# Patient Record
Sex: MALE | Race: WHITE | Employment: UNEMPLOYED | URBAN - METROPOLITAN AREA
[De-identification: names, ages, dates, MRNs, and addresses within clinical notes are randomized per-mention and may not be internally consistent; named-entity substitution may affect disease eponyms.]

---

## 2018-08-06 PROCEDURE — 96365 THER/PROPH/DIAG IV INF INIT: CPT | Performed by: EMERGENCY MEDICINE

## 2018-08-06 PROCEDURE — 99284 EMERGENCY DEPT VISIT MOD MDM: CPT | Mod: 25 | Performed by: EMERGENCY MEDICINE

## 2018-08-06 PROCEDURE — 87880 STREP A ASSAY W/OPTIC: CPT | Performed by: FAMILY MEDICINE

## 2018-08-06 PROCEDURE — 99284 EMERGENCY DEPT VISIT MOD MDM: CPT | Mod: Z6 | Performed by: EMERGENCY MEDICINE

## 2018-08-06 PROCEDURE — 96375 TX/PRO/DX INJ NEW DRUG ADDON: CPT | Performed by: EMERGENCY MEDICINE

## 2018-08-07 ENCOUNTER — HOSPITAL ENCOUNTER (EMERGENCY)
Facility: CLINIC | Age: 38
Discharge: HOME | End: 2018-08-07
Attending: EMERGENCY MEDICINE | Admitting: EMERGENCY MEDICINE

## 2018-08-07 VITALS
WEIGHT: 211.64 LBS | DIASTOLIC BLOOD PRESSURE: 67 MMHG | TEMPERATURE: 100.9 F | HEART RATE: 98 BPM | RESPIRATION RATE: 16 BRPM | OXYGEN SATURATION: 97 % | SYSTOLIC BLOOD PRESSURE: 111 MMHG

## 2018-08-07 DIAGNOSIS — J02.0 STREP THROAT: ICD-10-CM

## 2018-08-07 DIAGNOSIS — R50.9 FEVER, UNSPECIFIED FEVER CAUSE: ICD-10-CM

## 2018-08-07 LAB
DEPRECATED S PYO AG THROAT QL EIA: ABNORMAL
SPECIMEN SOURCE: ABNORMAL

## 2018-08-07 PROCEDURE — 25000132 ZZH RX MED GY IP 250 OP 250 PS 637: Performed by: EMERGENCY MEDICINE

## 2018-08-07 PROCEDURE — 25000128 H RX IP 250 OP 636: Performed by: EMERGENCY MEDICINE

## 2018-08-07 RX ORDER — CEFTRIAXONE SODIUM 1 G
1 VIAL (EA) INJECTION ONCE
Status: COMPLETED | OUTPATIENT
Start: 2018-08-07 | End: 2018-08-07

## 2018-08-07 RX ORDER — ACETAMINOPHEN AND CODEINE PHOSPHATE 300; 30 MG/1; MG/1
2 TABLET ORAL EVERY 6 HOURS PRN
Qty: 12 TABLET | Refills: 0 | Status: SHIPPED | OUTPATIENT
Start: 2018-08-07

## 2018-08-07 RX ORDER — ACETAMINOPHEN 500 MG
1000 TABLET ORAL ONCE
Status: COMPLETED | OUTPATIENT
Start: 2018-08-07 | End: 2018-08-07

## 2018-08-07 RX ORDER — KETOROLAC TROMETHAMINE 30 MG/ML
30 INJECTION, SOLUTION INTRAMUSCULAR; INTRAVENOUS ONCE
Status: COMPLETED | OUTPATIENT
Start: 2018-08-07 | End: 2018-08-07

## 2018-08-07 RX ORDER — IBUPROFEN 200 MG
600 TABLET ORAL AT BEDTIME
Qty: 15 TABLET | Refills: 0 | Status: SHIPPED | OUTPATIENT
Start: 2018-08-07 | End: 2018-08-12

## 2018-08-07 RX ADMIN — CEFTRIAXONE SODIUM 1 G: 1 INJECTION, POWDER, FOR SOLUTION INTRAMUSCULAR; INTRAVENOUS at 01:42

## 2018-08-07 RX ADMIN — ACETAMINOPHEN 1000 MG: 500 TABLET ORAL at 01:41

## 2018-08-07 RX ADMIN — KETOROLAC TROMETHAMINE 30 MG: 30 INJECTION, SOLUTION INTRAMUSCULAR at 01:42

## 2018-08-07 ASSESSMENT — ENCOUNTER SYMPTOMS
FATIGUE: 1
FEVER: 1
SORE THROAT: 1
CHILLS: 1

## 2018-08-07 NOTE — ED PROVIDER NOTES
History     Chief Complaint   Patient presents with     Fever     Pt c/o throbbing throat pain, severe headache and generalized body ache.Having chills.     Generalized Body Aches     HPI  Peter Mcdaniels is a 37 year old male who is seen for evaluation of fevers chills and sore throat that has been ongoing for 2 days.  She denies any nausea no vomiting no abdominal pain no vision changes no headache.  Patient says that Tylenol helps but after the 4 hours are gone she is chills and fever returns no cough no dysuria.    Current Facility-Administered Medications   Medication     acetaminophen (TYLENOL) tablet 1,000 mg     cefTRIAXone (ROCEPHIN) injection 1 g     ketorolac (TORADOL) injection 30 mg     Current Outpatient Prescriptions   Medication     acetaminophen-codeine (TYLENOL WITH CODEINE #3) 300-30 MG per tablet     amoxicillin-clavulanate (AUGMENTIN) 875-125 MG per tablet     Diphenhydramine-PE-APAP (SEVERE COLD & COUGH NIGHTTIME PO)     ibuprofen (ADVIL/MOTRIN) 200 MG tablet     History reviewed. No pertinent past medical history.   No Known Allergies  Past Surgical History:   Procedure Laterality Date     ABDOMEN SURGERY           I have reviewed the Medications, Allergies, Past Medical and Surgical History, and Social History in the Epic system.    Review of Systems   Constitutional: Positive for chills, fatigue and fever.   HENT: Positive for sore throat.    All other systems reviewed and are negative.      Physical Exam   BP: 124/74  Heart Rate: 108  Temp: 101.9  F (38.8  C)  Resp: 18  Weight: 96 kg (211 lb 10.3 oz)  SpO2: 96 %      Physical Exam   Constitutional: No distress.   HENT:   Head: Atraumatic.   Mouth/Throat: Oropharynx is clear and moist. No oropharyngeal exudate.   Patient has mild to moderate pharyngitis but there is no trismus there is no evidence of PTA.  No masses.  Symmetric oropharynx noted   Eyes: Pupils are equal, round, and reactive to light. No scleral icterus.   Neck: Normal  range of motion. Neck supple. No JVD present.   Cardiovascular: Normal heart sounds and intact distal pulses.    Pulmonary/Chest: Breath sounds normal. No respiratory distress. He has no wheezes. He has no rales. He exhibits no tenderness.   Abdominal: Soft. Bowel sounds are normal. He exhibits no distension and no mass. There is no tenderness. There is no rebound and no guarding.   Musculoskeletal: He exhibits no edema or tenderness.   Lymphadenopathy:     He has cervical adenopathy.   Skin: Skin is warm. No rash noted. He is not diaphoretic. No erythema. No pallor.       ED Course     ED Course   Patient noted to be positive for strep throat which was done by triage at this point based on the history physical examination presentation I do not feel that this patient is septic I feel that his mild tachycardia is relative due to the elevated temperature.. Patient is clearly nontoxic and well-appearing no evidence of peritonsillar abscess.  Want to treat as outpatient with p.o. antibiotics and follow up.    Procedures   None         Critical Care time:  none             Labs Ordered and Resulted from Time of ED Arrival Up to the Time of Departure from the ED   RAPID STREP SCREEN - Abnormal; Notable for the following:        Result Value    Rapid Strep A Screen   (*)     Value: POSITIVE: Group A Streptococcal antigen detected by immunoassay.    All other components within normal limits            Assessments & Plan (with Medical Decision Making)   This is a well-appearing 37-year-old male without significant past medical history that comes in for evaluation of 2 day history of sore throat body aches myalgias fevers and chills.  She denies any abdominal pain no flank pain no trauma nausea no vomiting no headaches.  Physical exam reveals a well-appearing male in very mild acute distress secondary to mild fever.  She is also to have a reactive tachycardia but likely secondary to the fever.  Due to the fact that this  patient is clinically nontoxic in no significant distress I do not feel that there is no need for labs I plan to treat his symptoms with Toradol and Tylenol and will also treat him with Augmentin p.o. as outpatient.  Patient agrees, family agrees.    I have reviewed the nursing notes.    I have reviewed the findings, diagnosis, plan and need for follow up with the patient.    New Prescriptions    ACETAMINOPHEN-CODEINE (TYLENOL WITH CODEINE #3) 300-30 MG PER TABLET    Take 2 tablets by mouth every 6 hours as needed for pain    AMOXICILLIN-CLAVULANATE (AUGMENTIN) 875-125 MG PER TABLET    Take 1 tablet by mouth 2 times daily for 10 days    IBUPROFEN (ADVIL/MOTRIN) 200 MG TABLET    Take 3 tablets (600 mg) by mouth At Bedtime for 5 days       Final diagnoses:   Strep throat   Fever, unspecified fever cause       8/6/2018   [facility], [place], EMERGENCY DEPARTMENT     Darnell King MD  08/07/18 0133

## 2018-08-07 NOTE — ED AVS SNAPSHOT
" [facility], Emergency Department    [address]    [facility] [place] [address]    Phone:  [phone]    Fax:  [phone]                                       Peter Mcdaniels   MRN: [id number]    Department:  [facility], Emergency Department   Date of Visit:  8/6/2018           Patient Information     Date Of Birth          1980        Your diagnoses for this visit were:     Strep throat     Fever, unspecified fever cause        You were seen by Darnell King MD.      Follow-up Information     Follow up with [facility] In 2 days.    Specialties:  Family Medicine, Internal Medicine    Why:  2 make sure that you are getting better    Contact information:    [address] [address] [phone]        Discharge Instructions       Home  Back    ÇáÊåÇÈ ÇáÈáÚæã (Pharyngitis): ÈÈßÊÑíÇ \"ÓÊÑíÈ\" (STREP) [ÅÕÇÈÉ ãÄßÏÉ (Confirmed)]    ßÔÝ ÇáÝÍÕ ÇáÐí ÎÖÚÊ áå Úä ÅÕÇÈÊß ÈÈÈßÊÑíÇ ÇáÍóáÞ. ÊãËá ÈßÊÑíÇ ÇáÍáÞ ãÑÖÇð ãõÚÏíÇð  ÍíË íäÊÔÑ ÈÇáÓÚÇá Ãæ ÇáÊÞÈíá Ãæ áãÓ ÇáÂÎÑíä ÈÚÏ áãÓ Ýãß Ãæ ÃäÝß. æÊÔÊãá ÇáÃÚÑÇÖ Úáì Ãáã ÇáÒæÑ (ÇáÍáÞ) ÇáãÕÍæÈ ÈÇáÊæÑã  ææÌÚ ÇáÌÓã ÈÃßãáå  æÇáÕÏÇÚ  æÇáÍãì. ÓæÝ íÊã ÚáÇÌß ÈãÖÇÏ Ííæí (antibiotic) æÇáÐí íõÝÊÑÖ Ãä íÌÚáß ÊÔÚÑ ÈÊÍÓä ÎáÇá íæã Ãæ ÇËäíä.  ÇáÑÚÇíÉ Ýí ÇáãäÒá:    ÇáÊÒã ÈÇáÑÇÍÉ Ýí ÇáãäÒá ãÚ ÊäÇæá ßãíÉ æÝíÑÉ ãä ÇáÓæÇÆá áÊÌäÈ ÇáÅÕÇÈÉ ÈÇáÌÝÇÝ (dehydration).    áÇ íÌÈ ÇáÐåÇÈ Åáì ÇáãÏÑÓÉ Ãæ ÇáÚãá ØæÇá Ãæá íæãíä ãä ÇáÚáÇÌ ÈÇáãÖÇÏÇÊ ÇáÍíæíÉ. æÈÚÏåÇ áä Êßæä ãõÚÏíÇð  æÅÐÇ ßäÊ ÊÔÚÑ ÈÊÍÓä  Ýíãßäß ÇáÚæÏÉ Åáì ÇáãÏÑÓÉ Ãæ ÇáÚãá.    ÊäÇæá ãÖÇÏÇÊß ÇáÍíæíÉ áãÏÉ 10 ÃíÇã ßÇãáÉ  ÍÊì áæ ßäÊ ÊÔÚÑ ÈÊÍÓä ÈÚÏ ãÑæÑ ÇáÃíÇã ÇáÞáíáÉ ÇáÃæáì ãä ÇáÚáÇÌ. æíõÚÏ Ðáß ãåãÇð ááÛÇíÉ ááæÞÇíÉ ãä ãÑÖ ÇáÞáÈ Ãæ Çáßáì æÇáÐí ÞÏ íÕíÈß ßÃÍÏ ãÖÇÚÝÇÊ ÇáÅÕÇÈÉ ÈÈßÊÑíÇ ÇáÍáÞ ÇáÊí áã íÊã ÚáÇÌåÇ.    ÈÇáäÓÈÉ ááÃØÝÇá: ÇÓÊÎÏã ÏæÇÁ ÃÓíÊÇãíäæÝíä (acetaminophen ãËá Êíáíäæá (Tylenol)) áÚáÇÌ ÇáÍãì Ãæ ÇáÇÑÊÈÇß Ãæ ÚÏã ÇáÑÇÍÉ. Ýí ÍÇáÉ ÇáÃØÝÇá ÇáÕÛÇÑ ÇáÐíä ÊÒíÏ ÃÚãÇÑåã Úä ÓÊÉ " "ÃÔåÑ  íãßäß ÇÓÊÎÏÇã ÇáÅíÈÑæÝíä (ãæÊÑíä ááÃØÝÇá) ÈÏáÇð ãä \"Êíáíäæá\". [ãáÇÍÙÉ: ÅÐÇ ßÇä ØÝáß ãÕÇÈÇð ÈÍÇáÉ ãÒãäÉ ãä ãÑÖ ÇáßÈÏ Ãæ Çáßáì  Ãæ ÓÈÞ áå ÇáÅÕÇÈÉ ÈÞÑÍÉ Ýí ÇáãÚÏÉ Ãæ ÇáäÒíÝ ÇáãÚæí  ÝÚáíß ÈÇáÊÍÏË Åáì ÇáØÈíÈ ÞÈá ÇÓÊÎÏÇã åÐå ÇáÃÏæíÉ.] (áÇ íÌÈ ÃÈÏÇð ÊÞÏíã ÇáÃÓÈÑíä áÃí ÔÎÕ Ïæä Óä 18 ÚÇãÇð ãÕÇÈ ÇáÍãì  ÝÞÏ íÄÏí Ðáß Åáì ÍÏæË ÊáÝ ÍÇÏ Ýí ÇáßÈÏ).ÈÇáäÓÈÉ ááßÈÇÑ:æíãßäß ÇÓÊÎÏÇã ÃÓíÊÇãíäæÝíä (acetaminophen ãËá Êíáíäæá (Tylenol)) Ãæ ÅíÈæÈÑæÝíä (ãæäÊÑíä (Motrin)  ÃÏÝíá (Advil)) ááÓíØÑÉ Úáì ÇáÍãì Ãæ ÇáÃáã  ãÇ áã íÕÝ áß ÇáØÈíÈ ÏæÇÁð ãÓßäÇð ÂÎÑ ááÃáã. [ãáÇÍÙÉ: Ýí ÍÇáÉ ÇáÅÕÇÈÉ ÈÍÇáÉ ãÒãäÉ ãä ãÑÖ ÇáßÈÏ Ãæ Çáßáì  Ãæ ÓÈÞ áß ÇáÅÕÇÈÉ ÈÞÑÍÉ Ýí ÇáãÚÏÉ (stomach ulcer) Ãæ ÇáäÒíÝ ÇáãÚæí (GI bleeding)  ÝÚáíß ÈÇáÊÍÏË Åáì ÇáØÈíÈ ÞÈá ÇÓÊÎÏÇã åÐå ÇáÃÏæíÉ.]    ÊÚãá ÃÞÑÇÕ ÇáÇÓÊÍáÇÈ (lozenges) Ãæ ÇáÈÎÇÎÇÊ (sprays) ÇáãÎÕÕÉ ááÒæÑ (ãËá Chloraseptic æÛíÑå) Úáì ÊÞáíá ÇáÃáã. ßãÇ ÊÄÏí ÇáÛÑÛÑÉ ÈÇáãÇÁ ÇáãÇáÍ ÇáÏÇÝÆ Åáì ÊÞáíá Ãáã ÇáÍáÞ. áÐáß  Þã ÈÅÐÇÈÉ äÕÝ ãáÚÞÉ ÕÛíÑÉ ãä ÇáãáÍ Ýí ßæÈ ãä ÇáãÇÁ ÇáÏÇÝÆ. æÊßæä åÐå ÇáØÑíÞÉ ãÝíÏÉ ÎÕæÕÇð ÞÈá ÇáæÌÈÇÊ.  ÊÇÈÚ ÇáÍÇáÉ  ãÚ ØÈíÈß Ãæ æÝÞÇð áÅÑÔÇÏÇÊ ÝÑíÞ ÇáÚãá áÏíäÇ ÅÐÇ áã ÊÊÍÓä ÍÇáÊß Úáì ãÏì ÇáÃÓÈæÚ ÇáÞÇÏã.  íÌÈ ÇáÍÕæá Úáì ÚäÇíÉ ØÈíÉ ÝæÑíÉ ÅÐÇ ÍÏË Ãí ããÇ íáí:    Íãì ÊÈáÛ 38  ãÆæíÉ (100.4  ÝåÑäåÇíÊ) Ãæ ÃÚáì ÚäÏ ÞíÇÓåÇ ÈÇáÊÑãæãÊÑ Ýí ÇáÝã  æÚÏã ÊÍÓäåÇ ÚäÏ ÊÞÏíã ÃÏæíÉ áÊÎÝíÝ ÇáÍãì    ÍÇáÉ ÌÏíÏÉ Ãæ ãÊÒÇíÏÉ ãä ÇáÃáã Ýí ÇáÃÐäíä Ãæ ÇáÌíæÈ ÇáÃäÝíÉ Ãæ ÇáÕÏÇÚ    æÌæÏ ßÊá ãÄáãÉ Ýí ÙåÑ ÇáÚäÞ    ÚÏã ÇáÞÏÑÉ Úáì ÈáÚ ÇáÓæÇÆá Ãæ ÝÊÍ Ýãß Úáì ÇÊÓÇÚå ÈÓÈÈ ÇáÃáã Ýí ÇáÍáÞ    ÕÚæÈÉ Ýí ÇáÊäÝÓ Ãæ ÇÑÊÝÇÚ ÕæÊ ÇáÊäÝÓ    ÍÔÑÌÉ ÇáÕæÊ (Muffled voice)    ÙåæÑ ÍÇáÉ ÌÏíÏÉ ãä ÇáØÝÍ ÇáÌáÏí (rash)    [id number] [address] [address]. All rights reserved. This information is not intended as a substitute for professional medical care. Always follow your healthcare professional's instructions.    He is very important that you follow-up with your primary care doctor in 2 days to make sure that you are getting better and to see if " any other tests or treatments will be needed.  Fevers get worse if vomiting abdominal pain, you are not able to open your mouth, or if any other concerns develop please return to emergency department immediately otherwise follow-up with your doctor.  We recommend that you take Motrin (ibuprofen) at night to your for fever goes away while you are sleeping in the you take Tylenol in the morning.  Be careful not to take Tylenol and codeine along with other regular Tylenol the same time    24 Hour Appointment Hotline       To make an appointment at any [facility], call [phone] ([phone]). If you don't have a family doctor or clinic, we will help you find one. [facility] are conveniently located to serve the needs of you and your family.             Review of your medicines      START taking        Dose / Directions Last dose taken    acetaminophen-codeine 300-30 MG per tablet   Commonly known as:  TYLENOL WITH CODEINE #3   Dose:  2 tablet   Quantity:  12 tablet        Take 2 tablets by mouth every 6 hours as needed for pain   Refills:  0        amoxicillin-clavulanate 875-125 MG per tablet   Commonly known as:  AUGMENTIN   Dose:  1 tablet   Quantity:  20 tablet        Take 1 tablet by mouth 2 times daily for 10 days   Refills:  0          CONTINUE these medicines which may have CHANGED, or have new prescriptions. If we are uncertain of the size of tablets/capsules you have at home, strength may be listed as something that might have changed.        Dose / Directions Last dose taken    ibuprofen 200 MG tablet   Commonly known as:  ADVIL/MOTRIN   Dose:  600 mg   What changed:    - medication strength  - how much to take  - when to take this   Quantity:  15 tablet        Take 3 tablets (600 mg) by mouth At Bedtime for 5 days   Refills:  0          Our records show that you are taking the medicines listed below. If these are incorrect, please call your family doctor or clinic.        Dose /  Directions Last dose taken    SEVERE COLD & COUGH NIGHTTIME PO        Refills:  0                Information about OPIOIDS     PRESCRIPTION OPIOIDS: WHAT YOU NEED TO KNOW   We gave you an opioid (narcotic) pain medicine. It is important to manage your pain, but opioids are not always the best choice. You should first try all the other options your care team gave you. Take this medicine for as short a time (and as few doses) as possible.     These medicines have risks:    DO NOT drive when on new or higher doses of pain medicine. These medicines can affect your alertness and reaction times, and you could be arrested for driving under the influence (DUI). If you need to use opioids long-term, talk to your care team about driving.    DO NOT operate heave machinery    DO NOT do any other dangerous activities while taking these medicines.     DO NOT drink any alcohol while taking these medicines.      If the opioid prescribed includes acetaminophen, DO NOT take with any other medicines that contain acetaminophen. Read all labels carefully. Look for the word  acetaminophen  or  Tylenol.  Ask your pharmacist if you have questions or are unsure.    You can get addicted to pain medicines, especially if you have a history of addiction (chemical, alcohol or substance dependence). Talk to your care team about ways to reduce this risk.    Store your pills in a secure place, locked if possible. We will not replace any lost or stolen medicine. If you don t finish your medicine, please throw away (dispose) as directed by your pharmacist. The [place] [employer]ution [employer] Agency has more information about safe disposal: https://www.pca.[facility].[place]/living-green/managing-unwanted-medications.     All opioids tend to cause constipation. Drink plenty of water and eat foods that have a lot of fiber, such as fruits, vegetables, prune juice, apple juice and high-fiber cereal. Take a laxative (Miralax, milk of magnesia, Colace, Senna) if you  "don t move your bowels at least every other day.         Prescriptions were sent or printed at these locations (3 Prescriptions)                   Other Prescriptions                Printed at Department/Unit printer (3 of 3)         acetaminophen-codeine (TYLENOL WITH CODEINE #3) 300-30 MG per tablet               amoxicillin-clavulanate (AUGMENTIN) 875-125 MG per tablet               ibuprofen (ADVIL/MOTRIN) 200 MG tablet                Procedures and tests performed during your visit     Rapid strep screen      Orders Needing Specimen Collection     None      Pending Results     No orders found from 8/5/2018 to 8/8/2018.            Pending Culture Results     No orders found from 8/5/2018 to 8/8/2018.            Pending Results Instructions     If you had any lab results that were not finalized at the time of your Discharge, you can call the ED Lab Result RN at [phone]. You will be contacted by this team for any positive Lab results or changes in treatment. The nurses are available 7 days a week from 10A to 6:30P.  You can leave a message 24 hours per day and they will return your call.        Thank you for choosing [facility]       Thank you for choosing [facility] for your care. Our goal is always to provide you with excellent care. Hearing back from our patients is one way we can continue to improve our services. Please take a few minutes to complete the written survey that you may receive in the mail after you visit with us. Thank you!        [facility] Information     [employer] lets you send messages to your doctor, view your test results, renew your prescriptions, schedule appointments and more. To sign up, go to [email or web address] . Click on \"Log in\" on the left side of the screen, which will take you to the Welcome page. Then click on \"Sign up Now\" on the right side of the page.     You will be asked to enter the access code listed below, as well as some personal information. Please follow the " directions to create your username and password.     Your access code is: [email or web address]  Expires: 2018  1:51 AM     Your access code will  in 90 days. If you need help or a new code, please call your [place] clinic or [phone].        Care EveryWhere ID     This is your Care EveryWhere ID. This could be used by other organizations to access your [place] medical records  [id number]        Equal Access to Services     SHANA RAMIREZ : Hadii adelina arellano hadasho Soomaali, waaxda luqadaha, qaybta kaalmada adeegyada, luis fernando mcmahon . So [facility] [phone].    ATENCIÓN: Si habla español, tiene a galdamez disposición servicios gratuitos de asistencia lingüística. Llame al [phone].    We comply with applicable federal civil rights laws and [place] laws. We do not discriminate on the basis of race, color, national origin, age, disability, sex, sexual orientation, or gender identity.            After Visit Summary       This is your record. Keep this with you and show to your community pharmacist(s) and doctor(s) at your next visit.

## 2018-08-07 NOTE — ED AVS SNAPSHOT
[facility], Emergency Department    [address]    [facility] [address]    Phone:  [phone]    Fax:  [phone]                                       Peter Mcdaniels   MRN: [id number]    Department:  [facility], Emergency Department   Date of Visit:  8/6/2018           After Visit Summary Signature Page     I have received my discharge instructions, and my questions have been answered. I have discussed any challenges I see with this plan with the nurse or doctor.    ..........................................................................................................................................  Patient/Patient Representative Signature      ..........................................................................................................................................  Patient Representative Print Name and Relationship to Patient    ..................................................               ................................................  Date                                            Time    ..........................................................................................................................................  Reviewed by Signature/Title    ...................................................              ..............................................  Date                                                            Time

## 2018-08-07 NOTE — DISCHARGE INSTRUCTIONS
"Home  Back    ÇáÊåÇÈ ÇáÈáÚæã (Pharyngitis): ÈÈßÊÑíÇ \"ÓÊÑíÈ\" (STREP) [ÅÕÇÈÉ ãÄßÏÉ (Confirmed)]    ßÔÝ ÇáÝÍÕ ÇáÐí ÎÖÚÊ áå Úä ÅÕÇÈÊß ÈÈÈßÊÑíÇ ÇáÍóáÞ. ÊãËá ÈßÊÑíÇ ÇáÍáÞ ãÑÖÇð ãõÚÏíÇð  ÍíË íäÊÔÑ ÈÇáÓÚÇá Ãæ ÇáÊÞÈíá Ãæ áãÓ ÇáÂÎÑíä ÈÚÏ áãÓ Ýãß Ãæ ÃäÝß. æÊÔÊãá ÇáÃÚÑÇÖ Úáì Ãáã ÇáÒæÑ (ÇáÍáÞ) ÇáãÕÍæÈ ÈÇáÊæÑã  ææÌÚ ÇáÌÓã ÈÃßãáå  æÇáÕÏÇÚ  æÇáÍãì. ÓæÝ íÊã ÚáÇÌß ÈãÖÇÏ Ííæí (antibiotic) æÇáÐí íõÝÊÑÖ Ãä íÌÚáß ÊÔÚÑ ÈÊÍÓä ÎáÇá íæã Ãæ ÇËäíä.  ÇáÑÚÇíÉ Ýí ÇáãäÒá:    ÇáÊÒã ÈÇáÑÇÍÉ Ýí ÇáãäÒá ãÚ ÊäÇæá ßãíÉ æÝíÑÉ ãä ÇáÓæÇÆá áÊÌäÈ ÇáÅÕÇÈÉ ÈÇáÌÝÇÝ (dehydration).    áÇ íÌÈ ÇáÐåÇÈ Åáì ÇáãÏÑÓÉ Ãæ ÇáÚãá ØæÇá Ãæá íæãíä ãä ÇáÚáÇÌ ÈÇáãÖÇÏÇÊ ÇáÍíæíÉ. æÈÚÏåÇ áä Êßæä ãõÚÏíÇð  æÅÐÇ ßäÊ ÊÔÚÑ ÈÊÍÓä  Ýíãßäß ÇáÚæÏÉ Åáì ÇáãÏÑÓÉ Ãæ ÇáÚãá.    ÊäÇæá ãÖÇÏÇÊß ÇáÍíæíÉ áãÏÉ 10 ÃíÇã ßÇãáÉ  ÍÊì áæ ßäÊ ÊÔÚÑ ÈÊÍÓä ÈÚÏ ãÑæÑ ÇáÃíÇã ÇáÞáíáÉ ÇáÃæáì ãä ÇáÚáÇÌ. æíõÚÏ Ðáß ãåãÇð ááÛÇíÉ ááæÞÇíÉ ãä ãÑÖ ÇáÞáÈ Ãæ Çáßáì æÇáÐí ÞÏ íÕíÈß ßÃÍÏ ãÖÇÚÝÇÊ ÇáÅÕÇÈÉ ÈÈßÊÑíÇ ÇáÍáÞ ÇáÊí áã íÊã ÚáÇÌåÇ.    ÈÇáäÓÈÉ ááÃØÝÇá: ÇÓÊÎÏã ÏæÇÁ ÃÓíÊÇãíäæÝíä (acetaminophen ãËá Êíáíäæá (Tylenol)) áÚáÇÌ ÇáÍãì Ãæ ÇáÇÑÊÈÇß Ãæ ÚÏã ÇáÑÇÍÉ. Ýí ÍÇáÉ ÇáÃØÝÇá ÇáÕÛÇÑ ÇáÐíä ÊÒíÏ ÃÚãÇÑåã Úä ÓÊÉ ÃÔåÑ  íãßäß ÇÓÊÎÏÇã ÇáÅíÈÑæÝíä (ãæÊÑíä ááÃØÝÇá) ÈÏáÇð ãä \"Êíáíäæá\". [ãáÇÍÙÉ: ÅÐÇ ßÇä ØÝáß ãÕÇÈÇð ÈÍÇáÉ ãÒãäÉ ãä ãÑÖ ÇáßÈÏ Ãæ Çáßáì  Ãæ ÓÈÞ áå ÇáÅÕÇÈÉ ÈÞÑÍÉ Ýí ÇáãÚÏÉ Ãæ ÇáäÒíÝ ÇáãÚæí  ÝÚáíß ÈÇáÊÍÏË Åáì ÇáØÈíÈ ÞÈá ÇÓÊÎÏÇã åÐå ÇáÃÏæíÉ.] (áÇ íÌÈ ÃÈÏÇð ÊÞÏíã ÇáÃÓÈÑíä áÃí ÔÎÕ Ïæä Óä 18 ÚÇãÇð ãÕÇÈ ÇáÍãì  ÝÞÏ íÄÏí Ðáß Åáì ÍÏæË ÊáÝ ÍÇÏ Ýí ÇáßÈÏ).ÈÇáäÓÈÉ ááßÈÇÑ:æíãßäß ÇÓÊÎÏÇã ÃÓíÊÇãíäæÝíä (acetaminophen ãËá Êíáíäæá (Tylenol)) Ãæ ÅíÈæÈÑæÝíä (ãæäÊÑíä (Motrin)  ÃÏÝíá (Advil)) ááÓíØÑÉ Úáì ÇáÍãì Ãæ ÇáÃáã  ãÇ áã íÕÝ áß ÇáØÈíÈ ÏæÇÁð ãÓßäÇð ÂÎÑ ááÃáã. [ãáÇÍÙÉ: Ýí ÍÇáÉ ÇáÅÕÇÈÉ ÈÍÇáÉ ãÒãäÉ ãä ãÑÖ ÇáßÈÏ Ãæ Çáßáì  Ãæ ÓÈÞ áß ÇáÅÕÇÈÉ ÈÞÑÍÉ Ýí ÇáãÚÏÉ (stomach ulcer) Ãæ ÇáäÒíÝ ÇáãÚæí (GI bleeding)  ÝÚáíß ÈÇáÊÍÏË Åáì ÇáØÈíÈ ÞÈá ÇÓÊÎÏÇã åÐå ÇáÃÏæíÉ.]    ÊÚãá ÃÞÑÇÕ ÇáÇÓÊÍáÇÈ (lozenges) Ãæ ÇáÈÎÇÎÇÊ (sprays) ÇáãÎÕÕÉ ááÒæÑ (ãËá Chloraseptic æÛíÑå) Úáì ÊÞáíá " ÇáÃáã. ßãÇ ÊÄÏí ÇáÛÑÛÑÉ ÈÇáãÇÁ ÇáãÇáÍ ÇáÏÇÝÆ Åáì ÊÞáíá Ãáã ÇáÍáÞ. áÐáß  Þã ÈÅÐÇÈÉ äÕÝ ãáÚÞÉ ÕÛíÑÉ ãä ÇáãáÍ Ýí ßæÈ ãä ÇáãÇÁ ÇáÏÇÝÆ. æÊßæä åÐå ÇáØÑíÞÉ ãÝíÏÉ ÎÕæÕÇð ÞÈá ÇáæÌÈÇÊ.  ÊÇÈÚ ÇáÍÇáÉ  ãÚ ØÈíÈß Ãæ æÝÞÇð áÅÑÔÇÏÇÊ ÝÑíÞ ÇáÚãá áÏíäÇ ÅÐÇ áã ÊÊÍÓä ÍÇáÊß Úáì ãÏì ÇáÃÓÈæÚ ÇáÞÇÏã.  íÌÈ ÇáÍÕæá Úáì ÚäÇíÉ ØÈíÉ ÝæÑíÉ ÅÐÇ ÍÏË Ãí ããÇ íáí:    Íãì ÊÈáÛ 38  ãÆæíÉ (100.4  ÝåÑäåÇíÊ) Ãæ ÃÚáì ÚäÏ ÞíÇÓåÇ ÈÇáÊÑãæãÊÑ Ýí ÇáÝã  æÚÏã ÊÍÓäåÇ ÚäÏ ÊÞÏíã ÃÏæíÉ áÊÎÝíÝ ÇáÍãì    ÍÇáÉ ÌÏíÏÉ Ãæ ãÊÒÇíÏÉ ãä ÇáÃáã Ýí ÇáÃÐäíä Ãæ ÇáÌíæÈ ÇáÃäÝíÉ Ãæ ÇáÕÏÇÚ    æÌæÏ ßÊá ãÄáãÉ Ýí ÙåÑ ÇáÚäÞ    ÚÏã ÇáÞÏÑÉ Úáì ÈáÚ ÇáÓæÇÆá Ãæ ÝÊÍ Ýãß Úáì ÇÊÓÇÚå ÈÓÈÈ ÇáÃáã Ýí ÇáÍáÞ    ÕÚæÈÉ Ýí ÇáÊäÝÓ Ãæ ÇÑÊÝÇÚ ÕæÊ ÇáÊäÝÓ    ÍÔÑÌÉ ÇáÕæÊ (Muffled voice)    ÙåæÑ ÍÇáÉ ÌÏíÏÉ ãä ÇáØÝÍ ÇáÌáÏí (rash)    [id number] [place] [address]. All rights reserved. This information is not intended as a substitute for professional medical care. Always follow your healthcare professional's instructions.    He is very important that you follow-up with your primary care doctor in 2 days to make sure that you are getting better and to see if any other tests or treatments will be needed.  Fevers get worse if vomiting abdominal pain, you are not able to open your mouth, or if any other concerns develop please return to emergency department immediately otherwise follow-up with your doctor.  We recommend that you take Motrin (ibuprofen) at night to your for fever goes away while you are sleeping in the you take Tylenol in the morning.  Be careful not to take Tylenol and codeine along with other regular Tylenol the same time